# Patient Record
Sex: MALE | Race: WHITE | URBAN - METROPOLITAN AREA
[De-identification: names, ages, dates, MRNs, and addresses within clinical notes are randomized per-mention and may not be internally consistent; named-entity substitution may affect disease eponyms.]

---

## 2022-09-29 ENCOUNTER — OFFICE VISIT (OUTPATIENT)
Dept: FAMILY MEDICINE | Facility: CLINIC | Age: 22
End: 2022-09-29
Payer: COMMERCIAL

## 2022-09-29 VITALS
WEIGHT: 185 LBS | HEART RATE: 78 BPM | SYSTOLIC BLOOD PRESSURE: 133 MMHG | TEMPERATURE: 98.5 F | OXYGEN SATURATION: 98 % | DIASTOLIC BLOOD PRESSURE: 74 MMHG

## 2022-09-29 DIAGNOSIS — R07.0 THROAT PAIN: Primary | ICD-10-CM

## 2022-09-29 LAB
DEPRECATED S PYO AG THROAT QL EIA: NEGATIVE
GROUP A STREP BY PCR: NOT DETECTED

## 2022-09-29 PROCEDURE — 99203 OFFICE O/P NEW LOW 30 MIN: CPT

## 2022-09-29 PROCEDURE — 87651 STREP A DNA AMP PROBE: CPT

## 2022-09-29 NOTE — PROGRESS NOTES
Assessment & Plan     Throat pain    - Streptococcus A Rapid Scr w Reflx to PCR  Rapid strep negative    15 minutes spent on the date of the encounter doing chart review, review of test results, patient visit and documentation        See Patient Instructions    Return in about 1 week (around 10/6/2022), or if symptoms worsen or fail to improve.    [facility]  [facility]    Donnie Huber is a 22 year old presenting for the following health issues:  Pharyngitis (1  week, hoarse voice)      HPI     Acute Illness  Acute illness concerns: sore thraot  Onset/Duration: 1 week  Symptoms:  Fever: No  Chills/Sweats: No  Headache (location?): No  Sinus Pressure: No  Conjunctivitis:  No  Ear Pain: no  Rhinorrhea: No  Congestion: No  Sore Throat: YES  Cough: no  Wheeze: No  Decreased Appetite: No  Nausea: No  Fatigue/Achiness: No  Sick/Strep Exposure: No  Therapies tried and outcome: None  No hx of allergies, COVID tests at home have been negative x 2    Review of Systems   Constitutional, HEENT, cardiovascular, pulmonary, gi and gu systems are negative, except as otherwise noted.      Objective    /74 (BP Location: Right arm, Patient Position: Sitting, Cuff Size: Adult Regular)   Pulse 78   Temp 98.5  F (36.9  C) (Oral)   Wt 83.9 kg (185 lb)   SpO2 98%   There is no height or weight on file to calculate BMI.  Physical Exam   GENERAL: healthy, alert and no distress  EYES: Eyes grossly normal to inspection, PERRL and conjunctivae and sclerae normal  HENT: ear canals and TM's normal, nose and mouth without ulcers or lesions  NECK: no adenopathy  RESP: lungs clear to auscultation - no rales, rhonchi or wheezes  CV: regular rates and rhythm, normal S1 S2, no S3 or S4 and no murmur, click or rub

## 2023-02-12 ENCOUNTER — HEALTH MAINTENANCE LETTER (OUTPATIENT)
Age: 23
End: 2023-02-12

## 2024-03-10 ENCOUNTER — HEALTH MAINTENANCE LETTER (OUTPATIENT)
Age: 24
End: 2024-03-10

## 2025-03-16 ENCOUNTER — HEALTH MAINTENANCE LETTER (OUTPATIENT)
Age: 25
End: 2025-03-16